# Patient Record
Sex: MALE | Race: WHITE
[De-identification: names, ages, dates, MRNs, and addresses within clinical notes are randomized per-mention and may not be internally consistent; named-entity substitution may affect disease eponyms.]

---

## 2020-02-18 ENCOUNTER — HOSPITAL ENCOUNTER (EMERGENCY)
Dept: HOSPITAL 63 - ER | Age: 60
Discharge: LEFT BEFORE DISCHARGE | End: 2020-02-18
Payer: SELF-PAY

## 2020-02-18 VITALS — BODY MASS INDEX: 28.09 KG/M2 | WEIGHT: 196.21 LBS | HEIGHT: 70 IN

## 2020-02-18 VITALS — SYSTOLIC BLOOD PRESSURE: 148 MMHG | DIASTOLIC BLOOD PRESSURE: 90 MMHG

## 2020-02-18 DIAGNOSIS — R07.2: Primary | ICD-10-CM

## 2020-02-18 DIAGNOSIS — F17.210: ICD-10-CM

## 2020-02-18 DIAGNOSIS — I10: ICD-10-CM

## 2020-02-18 DIAGNOSIS — I25.10: ICD-10-CM

## 2020-02-18 DIAGNOSIS — Z95.1: ICD-10-CM

## 2020-02-18 DIAGNOSIS — Z90.49: ICD-10-CM

## 2020-02-18 LAB
ALBUMIN SERPL-MCNC: 3.4 G/DL (ref 3.4–5)
ALBUMIN/GLOB SERPL: 0.6 {RATIO} (ref 1–1.7)
ALP SERPL-CCNC: 121 U/L (ref 46–116)
ALT SERPL-CCNC: 250 U/L (ref 16–63)
ANION GAP SERPL CALC-SCNC: 9 MMOL/L (ref 6–14)
AST SERPL-CCNC: 161 U/L (ref 15–37)
BASOPHILS # BLD AUTO: 0.1 X10^3/UL (ref 0–0.2)
BASOPHILS NFR BLD: 1 % (ref 0–3)
BILIRUB SERPL-MCNC: 0.4 MG/DL (ref 0.2–1)
BUN/CREAT SERPL: 16 (ref 6–20)
CA-I SERPL ISE-MCNC: 13 MG/DL (ref 8–26)
CALCIUM SERPL-MCNC: 9.4 MG/DL (ref 8.5–10.1)
CHLORIDE SERPL-SCNC: 104 MMOL/L (ref 98–107)
CO2 SERPL-SCNC: 27 MMOL/L (ref 21–32)
CREAT SERPL-MCNC: 0.8 MG/DL (ref 0.7–1.3)
EOSINOPHIL NFR BLD: 0.5 X10^3/UL (ref 0–0.7)
EOSINOPHIL NFR BLD: 6 % (ref 0–3)
ERYTHROCYTE [DISTWIDTH] IN BLOOD BY AUTOMATED COUNT: 14.8 % (ref 11.5–14.5)
GFR SERPLBLD BASED ON 1.73 SQ M-ARVRAT: 99.6 ML/MIN
GLOBULIN SER-MCNC: 5.6 G/DL (ref 2.2–3.8)
GLUCOSE SERPL-MCNC: 100 MG/DL (ref 70–99)
HCT VFR BLD CALC: 39 % (ref 39–53)
HGB BLD-MCNC: 13.1 G/DL (ref 13–17.5)
LIPASE: 98 U/L (ref 73–393)
LYMPHOCYTES # BLD: 1.4 X10^3/UL (ref 1–4.8)
LYMPHOCYTES NFR BLD AUTO: 18 % (ref 24–48)
MAGNESIUM SERPL-MCNC: 2 MG/DL (ref 1.8–2.4)
MCH RBC QN AUTO: 29 PG (ref 25–35)
MCHC RBC AUTO-ENTMCNC: 34 G/DL (ref 31–37)
MCV RBC AUTO: 87 FL (ref 79–100)
MONO #: 0.8 X10^3/UL (ref 0–1.1)
MONOCYTES NFR BLD: 10 % (ref 0–9)
NEUT #: 5.3 X10^3UL (ref 1.8–7.7)
NEUTROPHILS NFR BLD AUTO: 65 % (ref 31–73)
PLATELET # BLD AUTO: 278 X10^3/UL (ref 140–400)
POTASSIUM SERPL-SCNC: 4 MMOL/L (ref 3.5–5.1)
PROT SERPL-MCNC: 9 G/DL (ref 6.4–8.2)
RBC # BLD AUTO: 4.5 X10^6/UL (ref 4.3–5.7)
SODIUM SERPL-SCNC: 140 MMOL/L (ref 136–145)
WBC # BLD AUTO: 8.1 X10^3/UL (ref 4–11)

## 2020-02-18 PROCEDURE — 83880 ASSAY OF NATRIURETIC PEPTIDE: CPT

## 2020-02-18 PROCEDURE — 99285 EMERGENCY DEPT VISIT HI MDM: CPT

## 2020-02-18 PROCEDURE — 83735 ASSAY OF MAGNESIUM: CPT

## 2020-02-18 PROCEDURE — 84484 ASSAY OF TROPONIN QUANT: CPT

## 2020-02-18 PROCEDURE — 85730 THROMBOPLASTIN TIME PARTIAL: CPT

## 2020-02-18 PROCEDURE — 80053 COMPREHEN METABOLIC PANEL: CPT

## 2020-02-18 PROCEDURE — 70450 CT HEAD/BRAIN W/O DYE: CPT

## 2020-02-18 PROCEDURE — 85025 COMPLETE CBC W/AUTO DIFF WBC: CPT

## 2020-02-18 PROCEDURE — 71045 X-RAY EXAM CHEST 1 VIEW: CPT

## 2020-02-18 PROCEDURE — 36415 COLL VENOUS BLD VENIPUNCTURE: CPT

## 2020-02-18 PROCEDURE — 93005 ELECTROCARDIOGRAM TRACING: CPT

## 2020-02-18 PROCEDURE — 85610 PROTHROMBIN TIME: CPT

## 2020-02-18 PROCEDURE — 83690 ASSAY OF LIPASE: CPT

## 2020-02-18 NOTE — PHYS DOC
Past History


Past Medical History:  CAD, Hypertension, Kidney Stones, Other


Additional Past Medical Histor:  PTSD


Past Surgical History:  Appendectomy, Coronary Bypass Surgery


Smoking:  Cigarettes


Alcohol Use:  None


Drug Use:  Heroin





Adult General


Chief Complaint


Chief Complaint:  CHEST WALL PAIN





[facility]


HPI





Patient is a 59-year-old male who presented to ER today by private vehicle 

complaining of substernal chest pain that radiated to his arms BOTH side, 

bilateral arms pain, heart palpitation, associated with trouble breathing 

started 30 minutes prior to arrival. Patient also complaints of headache, 

bilateral leg weakness. He has history of coronary artery disease, triple bypass

in the past. Patient has history hypertension, he is a smoker. Patient also 

abuses heroin, he had been injecting heroin for the last 2 years. Last time he 

used heroin was yesterday. Patient had not seen a doctor about A  year. [facility] 

[facility]  Patient also complaint of 

headache. Patient denies any slurred speech, no blurry vision.  He denies any 

cough or fever.  Patient took a couple baby aspirin at home prior to arrival.  

Patient was on nitroglycerin, lisinopril and plavix but he ran out of these 

medications.  [facility].





Review of Systems


Review of Systems





Constitutional: Denies fever or chills []


Eyes: Denies change in visual acuity, redness, or eye pain []


HENT: Denies nasal congestion or sore throat []


Respiratory: Denies cough or shortness of breath []


Cardiovascular: No additional information not addressed in HPI []


GI: Denies abdominal pain, nausea, vomiting, bloody stools or diarrhea []


: Denies dysuria or hematuria []


Musculoskeletal: Denies back pain or joint pain []


Integument: Denies rash or skin lesions []


Neurologic: Positive for headache,no focal weakness or sensory changes []


Endocrine: Denies polyuria or polydipsia []





All other systems were reviewed and found to be within normal limits, except as 

documented in this note.





Current Medications


Current Medications





Current Medications








 Medications


  (Trade)  Dose


 Ordered  Sig/Alexy  Start Time


 Stop Time Status Last Admin


Dose Admin


 


 Aspirin


  (Virgen Aspirin)  325 mg  1X  ONCE  20 07:45


 20 07:46 DC  














Allergies


Allergies





Allergies








Coded Allergies Type Severity Reaction Last Updated Verified


 


  No Known Drug Allergies    20 No











Physical Exam


Physical Exam





Constitutional: Well developed, well nourished, no acute distress, non-toxic 

appearance. []


HENT: Normocephalic, atraumatic, bilateral external ears normal, oropharynx 

moist, no oral exudates, nose normal. []


Eyes: PERRLA, EOMI, conjunctiva normal, no discharge. [] 


Neck: Normal range of motion, no tenderness, supple, no stridor. [] 


Cardiovascular:Heart rate regular rhythm, no murmur []


Lungs & Thorax:  Bilateral breath sounds clear to auscultation []


Abdomen: Bowel sounds normal, soft, no tenderness, no masses, no pulsatile 

masses. [] 


Skin: Warm, dry, no erythema, no rash. There are multiple old scar wounds on his

 upper extremities from heroin injection. 


Back: No tenderness, no CVA tenderness. [] 


Extremities: No tenderness, no cyanosis, no clubbing, ROM intact, no edema. [] 


Neurologic: Alert and oriented X 3, normal motor function, normal sensory 

function, no focal deficits noted. []


Psychologic: Affect normal, judgement normal, mood normal. He denies suicidal 

ideation, appeared very Anxious.





Current Patient Data


Vital Signs





                                   Vital Signs








  Date Time  Temp Pulse Resp B/P (MAP) Pulse Ox O2 Delivery O2 Flow Rate FiO2


 


20 07:09 98.3 106 16 162/113 (129) 99 Room Air  











EKG


EKG


EKG was done at 7:15, heart rate 104 BPM,, no ST segment elevation. There is 

sinus tachycardia.





Radiology/Procedures


Radiology/Procedures


[][facility] [address], [place] [address]


                                 [phone]


                                        


                                 IMAGING REPORT





                                     Signed





PATIENT: LALITA WANG   ACCOUNT: [id number]     MRN#: [id number]


: 1960           LOCATION: ER              AGE: 59


SEX: M                    EXAM DT: 20         ACCESSION#: [id number]


STATUS: REG ER            ORD. PHYSICIAN: RAFI CAMACHO DO


REASON: headache,stroke symptoms 1 1/2 hrs ago


PROCEDURE: CT HEAD WO CONTRAST





Examination: CT HEAD WO CONTRAST


 


History: Headache, stroke symptoms


 


Comparison/Correlation: None


 


Findings:  Axial images of the head were obtained without contrast. 


Ventricles are normal size. No intracranial hemorrhage, midline shift, or 


mass effect. Bony structures are unremarkable. Orbits are unremarkable.


 


Impression:


No acute process.


 


On 2020 at 8:13 AM, results were reported to the nurse assigned to 


this patient in the emergency Department.


 


[facility] Compliance Statement:


 


One or more of the following individualized dose reduction techniques were


utilized for this examination:  


1. Automated exposure control  


2. Adjustment of the mA and/or kV according to patient size  


3. Use of iterative reconstruction technique


 


Electronically signed by: Deniz Lara MD (2020 8:14 AM) [facility]














DICTATED AND SIGNED BY:     DENIZ LARA MD


DATE:     20 0814





CC: PCPADRIEL; CAMACHO,PETER T DO ~








                               [facility] [address], [place] [address]


                                 [phone]


                                        


                                 IMAGING REPORT





                                     Signed





PATIENT: LALITA WANG   ACCOUNT: [id number]     MRN#: [id number]


: 1960           LOCATION: ER              AGE: 59


SEX: M                    EXAM DT: 20         ACCESSION#: [id number]


STATUS: REG ER            ORD. PHYSICIAN: RAFI CAMACHO DO


REASON: chest pain,HX of heart disease, pt states he had a stroke earlier


PROCEDURE: PORTABLE CHEST 1V





 


AP portable chest  radiograph 2020


 


Clinical History: Chest pain. History of heart disease.


 


An AP erect portable digital radiograph of the chest was obtained.


 


No previous studies are available for comparison. The patient is post CABG


procedure. The cardiac silhouette is borderline enlarged. The thoracic 


aorta is mildly tortuous. No acute pulmonary infiltrate is seen. No 


pneumothorax or pleural effusion is noted. Degenerative changes are seen 


involving the thoracic spine along with both shoulders.


 


IMPRESSION: No acute abnormality is seen.


 


Electronically signed by: Last Duenas MD (2020 7:53 AM) [id number]














DICTATED AND SIGNED BY:     LAST DUENAS MD


DATE:     20 0753





CC: PCPADRIEL; RAFI CAMACHO DO ~





Course & Med Decision Making


Course & Med Decision Making


Pertinent Labs and Imaging studies reviewed. (See chart for details)





Patient is a 59-year-old male who was evaluated today in the ER due to chest 

pain, headache. work up so for did not reveal any acute problem however symptoms

 just started this morning. Patient was advised to be admitted to hospital for 

further evaluation due to his risk factors. Patient said he feels much better 

now he would rather go home. Patient's son is here with him. Patient would like 

a prescription for his lisinopril and his nitroglycerin. [facility].  He signed out against medical 

advise.  





Patient is awake alert oriented, he is medically competent to make medical 

decision,. No criteria for involuntary confinement.  He fully understands the 

risks of sign out against medical advice including death.





Dragon Disclaimer


Dragon Disclaimer


This electronic medical record was generated, in whole or in part, using a voice

 recognition dictation system.





Departure


Departure:


Impression:  


   Primary Impression:  


   Chest pain


Disposition:   AGAINST MEDICAL ADVICE


Condition:  STABLE


Referrals:  


PCP,NO (PCP)


follow up with your doctor today or tomorrow. Return if worsen symptoms.


Patient Instructions:  Discharge Against Medical Advice


Scripts


Nitroglycerin (NITROGLYCERIN SubLingual) 0.4 Mg Tab.subl


1 TAB SL UD PRN for chest pain, #25 TAB 0 Refills


   1st sign of attack; may repeat every 5 mins; if pain persists after 3 in 15 

min, medical attention is recommended


   Prov: RAFI CAMACHO DO         20 


Lisinopril (LISINOPRIL) 10 Mg Tablet


1 TAB PO DAILY for htn, #30 TAB 0 Refills


   Prov: RAFI CAMACHO DO         20





HEART Score for Chest Pain PTs


The HEART Score for CP Pts


HEART Score for Chest Pain:  








HEART Score for Chest Pain Response (Comments) Value


 


History Moderately Suspicious 1


 


ECG Normal 0


 


Age >45 - < 65 1


 


Risk Factors >3 Risk Factors or Hx CAD 2


 


Troponin < Normal Limit 0


 


Total  4








Risk Factors:


Risk Factors:  DM, Current or recent (<one month) smoker, HTN, HLP, family 

history of CAD, obesity.


Risk Scores:


Score 0 - 3:  2.5% MACE over next 6 weeks - Discharge Home


Score 4 - 6:  20.3% MACE over next 6 weeks - Admit for Clinical Observation


Score 7 - 10:  72.7% MACE over next 6 weeks - Early Invasive Strategies











RAFI CAMACHO DO                2020 07:56

## 2020-02-18 NOTE — EKG
[facility] [address], [place] [address]

Test Date:    2020               Test Time:    07:14:56

Pat Name:     LALITA WANG             Department:   

Patient ID:   [id number]           Room:          

Gender:       M                        Technician:   

:          1960               Requested By: RAFI CAMACHO

Order Number: [id number]            Reading MD:     

                                 Measurements

Intervals                              Axis          

Rate:         104                      P:            29

WY:           206                      QRS:          47

QRSD:         94                       T:            49

QT:           334                                    

QTc:          445                                    

                           Interpretive Statements

SINUS TACHYCARDIA

PROLONGED WY INTERVAL

QRS(T) CONTOUR ABNORMALITY

CONSIDER ANTEROLATERAL MYOCARDIAL DAMAGE

CONSISTENT WITH INFERIOR INFARCT

PROBABLY OLD

ABNORMAL ECG

RI6.01

No previous ECG available for comparison

## 2020-02-18 NOTE — RAD
Examination: CT HEAD WO CONTRAST

 

History: Headache, stroke symptoms

 

Comparison/Correlation: None

 

Findings:  Axial images of the head were obtained without contrast. 

Ventricles are normal size. No intracranial hemorrhage, midline shift, or 

mass effect. Bony structures are unremarkable. Orbits are unremarkable.

 

Impression:

No acute process.

 

On 2/18/2020 at 8:13 AM, results were reported to the nurse assigned to 

this patient in the emergency Department.

 

[facility] Compliance Statement:

 

One or more of the following individualized dose reduction techniques were

utilized for this examination:  

1. Automated exposure control  

2. Adjustment of the mA and/or kV according to patient size  

3. Use of iterative reconstruction technique

 

Electronically signed by: Deniz Mercedes MD (2/18/2020 8:14 AM) [facility]

## 2020-02-18 NOTE — RAD
AP portable chest  radiograph 2/18/2020

 

Clinical History: Chest pain. History of heart disease.

 

An AP erect portable digital radiograph of the chest was obtained.

 

No previous studies are available for comparison. The patient is post CABG

procedure. The cardiac silhouette is borderline enlarged. The thoracic 

aorta is mildly tortuous. No acute pulmonary infiltrate is seen. No 

pneumothorax or pleural effusion is noted. Degenerative changes are seen 

involving the thoracic spine along with both shoulders.

 

IMPRESSION: No acute abnormality is seen.

 

Electronically signed by: Last Duenas MD (2/18/2020 7:53 AM) [id number]

## 2021-01-14 ENCOUNTER — HOSPITAL ENCOUNTER (EMERGENCY)
Dept: HOSPITAL 61 - ER | Age: 61
Discharge: LEFT BEFORE DISCHARGE | End: 2021-01-14
Payer: COMMERCIAL

## 2021-01-14 VITALS — DIASTOLIC BLOOD PRESSURE: 100 MMHG | SYSTOLIC BLOOD PRESSURE: 194 MMHG

## 2021-01-14 VITALS — WEIGHT: 209.44 LBS | BODY MASS INDEX: 31.02 KG/M2 | HEIGHT: 69 IN

## 2021-01-14 DIAGNOSIS — R00.0: ICD-10-CM

## 2021-01-14 DIAGNOSIS — E78.00: ICD-10-CM

## 2021-01-14 DIAGNOSIS — Z98.890: ICD-10-CM

## 2021-01-14 DIAGNOSIS — U07.1: Primary | ICD-10-CM

## 2021-01-14 DIAGNOSIS — I11.9: ICD-10-CM

## 2021-01-14 DIAGNOSIS — R41.82: ICD-10-CM

## 2021-01-14 DIAGNOSIS — F17.200: ICD-10-CM

## 2021-01-14 DIAGNOSIS — F15.90: ICD-10-CM

## 2021-01-14 PROCEDURE — 99283 EMERGENCY DEPT VISIT LOW MDM: CPT

## 2021-01-14 NOTE — PHYS DOC
Past Medical History


Past Medical History:  CAD, High Cholesterol, Heart Disease, Hypertension


Past Surgical History:  Coronary Bypass Surgery (X4)


Smoking Status:  Current Every Day Smoker


Alcohol Use:  None


Drug Use:  Heroin (Reports being clean for past 25 days)





General Adult


EDM:


Chief Complaint:  ALTERED MENTAL STATUS





HPI:


HPI:





Patient is a 60-year-old male presenting via EMS for altered mental status.  

Patient is a  , has history of CAD, hypertension and prior CABG 

x4.  Has had URI-like symptoms for past 4 days, tested positive for COVID-19 2 

days ago and has been quarantining at hotel in [place], [place].  States he 

was with friends today, took an unknown medication and was found to have 

decreased level of consciousness which concerned friends who dropped him off at 

local EMS location.  Patient was evaluated and found to be hypoxic, 83% on room 

air and tachycardic.  GCS was 9.  He was given 2 mg Narcan with significant 

improvement in symptoms.  He was subsequently transferred to our facility for 

further evaluation.  On arrival, patient AO x3.  Admits ongoing URI-like 

symptoms such as nasal congestion and rhinorrhea, has not had any fevers, 

shortness of breath, productive cough, abdominal pain, nausea vomit or diarrhea.

 States "I know I am here, I just want to go home.  [facility] 

and it is better for me to go and see them anyways."





Review of Systems:


Review of Systems:


Fourteen body systems of review of systems have been reviewed. See HPI for 

pertinent positives and negative responses, other wise all other systems are 

negative, non-pertinent or non-contributory





Heart Score:


HEART Score for Chest Pain:  








HEART Score for Chest Pain Response (Comments) Value


 


History Slighlty/Non-Suspicious 0


 


Age >45 - < 65 1


 


Risk Factors >3 Risk Factors or Hx CAD 2


 


Total  3








Risk Factors:


Risk Factors:  DM, Current or recent (<one month) smoker, HTN, HLP, family 

history of CAD, obesity.


Risk Scores:


Score 0 - 3:  2.5% MACE over next 6 weeks - Discharge Home


Score 4 - 6:  20.3% MACE over next 6 weeks - Admit for Clinical Observation


Score 7 - 10:  72.7% MACE over next 6 weeks - Early Invasive Strategies





Physical Exam:


PE:


Constitutional: 


Pt is oriented to person, place, and time.  Age-appropriate.  Poor hygiene but 

otherwise well-appearing





HEENT:


Head: Normocephalic and atraumatic.


TMs clear, no hemotympanum


Conjunctivae and EOM are normal. Pupils are equal, round, and reactive to light.


Oropharynx is clear and dry, poor dentition.


No hematomas or lacerations or abrasions to face or scalp


OP clear, no blood, no malocclusion, dentition intact


Nares clear, no nasal septal hematoma


Midface stable





Neck:


C-spine midline nontender, no step-offs





Cardiovascular: 


Tachycardic, regular rhythm, no chest wall tenderness to palpation





Pulmonary/Chest: 


Effort normal and breath sounds normal. No respiratory distress. No wheezes. CTA

bilaterally





Abdominal: 


Soft. Bowel sounds are normal. Pt exhibits no distension. There is no 

tenderness.





Musculoskeletal: 


No bony tenderness to extremities, no deformities, full ROM extremities


Chest wall stable


Pelvis stable and non-tender


No vertebral TTP and spine without stepoffs





Neurological: 


Pt is alert and oriented to person, place, and time.


Moving all extremities willfully, able to wiggle all fingers and toes


Alert and oriented x 3


Motor and sensory function grossly intact





Skin: 


Skin is warm and dry. No abrasions, no lacerations





Psychiatric: 


Behavior is appropriate for situation





Current Patient Data:


Vital Signs:





Vital Signs








  Date Time  Temp Pulse Resp B/P (MAP) Pulse Ox O2 Delivery O2 Flow Rate FiO2


 


1/14/21 18:08 98.3  16 194/100 (131) 99 Room Air  





 98.3       











EKG:


EKG:


[]





Radiology/Procedures:


Radiology/Procedures:


[]





Course & Med Decision Making:


Course & Med Decision Making


I immediately saw patient on ER arrival and performed comprehensive history and 

physical exam.  Attempted to start diagnostic work-up but patient refused any

thing past vital signs, did not want IV inserted or other diagnostic studies 

such as EKG to be performed.  He voiced that he wanted to leave AMA.  I had an 

extensive discussion with the patient regarding the risks of leaving AMA 

including but not limited to death, permanent disability, and worsening 

condition.  Pt acknowledged the risks and agreed to take full responsibility.  

Pt was A&Ox4 and had full medical decision making capacity when they signed the 

AMA sheet.





Risks and Recommendations: The risks of refusing recommended care that were 

disclosed and acknowledged by the patient include loss of current lifestyle, 

permanent mental impairment, and death.


The recommended medical care being refused has been discussed with the patient 

and is to stay for continued monitoring, workup, and possible treatment.





Discharge Care: The patient understands they are welcome to return to the hosp

ital at any time to receive the recommended care or any other care at any time, 

regardless of their ability to pay for such care.


Discharge instructions were provided to the patient.





Dragon Disclaimer:


Dragon Disclaimer:


This electronic medical record was generated, in whole or in part, using a voice

 recognition dictation system.





Departure


Departure


Impression:  


   Primary Impression:  


   Left against medical advice


   Additional Impressions:  


   COVID-19


   Tachycardia


   HTN (hypertension)


   Altered mental status, unspecified


Disposition:  07 AMA/ELOPED/LWBS


Condition:  STABLE


Referrals:  


JOSE CAPONE (PCP)





Additional Instructions:  


You have been evaluated in the Emergency Department today. You are refusing 

further testing, imaging, and further admission and choosing to leave against 

medical advice. 


You were advised of your risks of leaving and understand that permanent harm, or

 even death, can occur from failing to follow the recommendations of the 

physician. 


Please call your primary care physician first thing tomorrow morning to review 

your ER visit today.  I advise you to schedule outpatient follow-up ASAP 

whenever safe to do so after current COVID-19 infection for repeat examination


Return to the Emergency Department immediately if you experience worsening or 

uncontrolled pain, persistent fevers, recurrent vomiting, blood in vomit, blood 

in stool, dark tarry stool, chest pain, shortness of breath, or for any other 

concerning symptoms.











YULIANA REED DO                 Jan 14, 2021 18:11